# Patient Record
Sex: FEMALE | NOT HISPANIC OR LATINO | URBAN - METROPOLITAN AREA
[De-identification: names, ages, dates, MRNs, and addresses within clinical notes are randomized per-mention and may not be internally consistent; named-entity substitution may affect disease eponyms.]

---

## 2021-01-12 ENCOUNTER — APPOINTMENT (OUTPATIENT)
Dept: URBAN - METROPOLITAN AREA SURGERY 8 | Age: 36
Setting detail: DERMATOLOGY
End: 2021-01-13

## 2021-01-12 DIAGNOSIS — L81.1 CHLOASMA: ICD-10-CM

## 2021-01-12 PROCEDURE — 99202 OFFICE O/P NEW SF 15 MIN: CPT

## 2021-01-12 PROCEDURE — OTHER COUNSELING: OTHER

## 2021-01-12 PROCEDURE — OTHER ADDITIONAL NOTES: OTHER

## 2021-01-12 ASSESSMENT — LOCATION DETAILED DESCRIPTION DERM
LOCATION DETAILED: LEFT SUPERIOR LATERAL MALAR CHEEK
LOCATION DETAILED: RIGHT SUPERIOR CENTRAL MALAR CHEEK

## 2021-01-12 ASSESSMENT — LOCATION SIMPLE DESCRIPTION DERM
LOCATION SIMPLE: LEFT CHEEK
LOCATION SIMPLE: RIGHT CHEEK

## 2021-01-12 ASSESSMENT — LOCATION ZONE DERM: LOCATION ZONE: FACE

## 2021-01-12 NOTE — PROCEDURE: ADDITIONAL NOTES
Render Risk Assessment In Note?: no
Additional Notes: Start skin lightening cream (hydroquinone, tretinoin, kojic acid, niacinamide, fluocinolone)  from [facility]. Prescription sent to pharmacy and will be shipped to patient \\n\\nRecommended strict use of sunscreen daily to prevent worsening/recurrence
Detail Level: Zone

## 2022-08-29 ENCOUNTER — APPOINTMENT (OUTPATIENT)
Dept: URBAN - METROPOLITAN AREA SURGERY 8 | Age: 37
Setting detail: DERMATOLOGY
End: 2022-08-29

## 2022-08-29 DIAGNOSIS — L81.1 CHLOASMA: ICD-10-CM

## 2022-08-29 PROCEDURE — OTHER COUNSELING: OTHER

## 2022-08-29 PROCEDURE — 99212 OFFICE O/P EST SF 10 MIN: CPT

## 2022-08-29 PROCEDURE — OTHER ADDITIONAL NOTES: OTHER

## 2022-08-29 PROCEDURE — OTHER TREATMENT REGIMEN: OTHER

## 2022-08-29 PROCEDURE — OTHER SKIN MEDICINALS: OTHER

## 2022-08-29 ASSESSMENT — LOCATION SIMPLE DESCRIPTION DERM
LOCATION SIMPLE: LEFT CHEEK
LOCATION SIMPLE: RIGHT CHEEK

## 2022-08-29 ASSESSMENT — LOCATION DETAILED DESCRIPTION DERM
LOCATION DETAILED: RIGHT SUPERIOR CENTRAL MALAR CHEEK
LOCATION DETAILED: LEFT SUPERIOR LATERAL MALAR CHEEK

## 2022-08-29 ASSESSMENT — LOCATION ZONE DERM: LOCATION ZONE: FACE

## 2022-08-29 NOTE — PROCEDURE: [facility]
Sig: Apply to affected areas twice daily
Sig: Apply a thin layer to the affected areas twice daily
Sig: Apply pea sized amount per area at night
Sig: Apply nightly to warts nightly under occlusion
Sig: Take one twice daily
Sig: Apply to affected areas on face twice daily
Sig: Apply a thin layer to the scar daily
Detail Level: Simple
Sig: Apply twice daily as instructed
Sig: Apply pea sized amount per area BID for 6 weeks. Take 2-3 month break and restart if needed
Sig: Apply a thin layer to the affected areas daily
Sig: Apply twice daily for 5 days
Product Type (1): Lightening Cream
Sig: Wash affected areas daily.
Sig: Apply a thin layer to the itching areas twice daily as needed
Sig: Apply a thin layer to the affected skin twice daily
Lightening Cream: Hydroquinone 12%, Kojic Acid 6%, Vitamin C 1%, Niacinamide 2% Cream
Intro Statement: I recommended the following products:

## 2022-08-29 NOTE — PROCEDURE: TREATMENT REGIMEN
Discontinue Regimen: Hydroquinone: 8%, Tretinoin: 0.1%, Kojic Acid: 1%, Niacinamide: 4%, Fluocinolone: 0.025% Cream
Initiate Treatment: Hydroquinone 12%, Kojic Acid 6%, Vitamin C 1%, Niacinamide 2% Cream - Apply pea sized amount per area BID for 6 weeks. Take 2-3 month break and restart if needed
Detail Level: Simple

## 2022-08-29 NOTE — PROCEDURE: ADDITIONAL NOTES
Additional Notes: improved but not at treatment goal. will switch to alternative lightening cream from [facility]\\n\\nnot currently pregnant and no plans at this time but discussed discontinuation if starts trying to actively conceive or becomes pregnant
Render Risk Assessment In Note?: no
Detail Level: Simple